# Patient Record
Sex: MALE | Race: WHITE | URBAN - METROPOLITAN AREA
[De-identification: names, ages, dates, MRNs, and addresses within clinical notes are randomized per-mention and may not be internally consistent; named-entity substitution may affect disease eponyms.]

---

## 2017-04-24 ENCOUNTER — THERAPY VISIT (OUTPATIENT)
Dept: PHYSICAL THERAPY | Facility: CLINIC | Age: 82
End: 2017-04-24
Payer: COMMERCIAL

## 2017-04-24 DIAGNOSIS — M25.512 ACUTE PAIN OF LEFT SHOULDER: Primary | ICD-10-CM

## 2017-04-24 PROCEDURE — 97161 PT EVAL LOW COMPLEX 20 MIN: CPT | Mod: GP | Performed by: PHYSICAL THERAPIST

## 2017-04-24 NOTE — PROGRESS NOTES
[facility] Initial Evaluation - Upper Extremity    Evaluation Date: April 24, 2017  Juan Vargas is a 86 year old male with a L shd condition.   Referral: GP    Employment:    Employment status:   Work Mechanical Stresses:   Leisure Mechanical Stresses:   Functional disability from present episode: combing hair getting dressed  Functional disability score (Neck Disability Index): see chart  Visual Analog Score (VAS 0-10): 10/10 with movement    HISTORY:  Pt presents with: ant  Lateral shoulder on  L intermittent  Denies CS pain achy burning sharp  Wearing sling so people dont run onto him  (Constant/Intermittent, Dull/achy/sharp/stabbing/throbbing and location/Radiation)  Associated symptoms (Numbness/tingling/weakness/swelling/catching/clicking/locking/subluxing):  weakness  Onset of symptoms (date/how):  On Easter evening, 4/16/2017  he was reaching with R arm lost his balance and fell to left landing on the shd   Unable to WB thru arm  Xrays wnl  He is R handed  Symptoms Status (new/recurrent/chronic): new and (improving/not changing/worsening): improving a little bit perhaps  Condition occurred in the following environment: home   Symptoms at onset: as above    (with consideration for bending, sitting, turning neck, dressing, reaching, gripping, am, as the day progresses, pm, when still, on the move, sleeping: prone, sup, side R/L, other )  Symptoms are made worse with:  Reach OH out to side BB  Symptoms are made better with:  Tylenol arthritis    Continued use makes pain:  SL LEFT Sleep disturbance: no ;   Pain at rest:  : Site:      Previous episodes: LOB 2 + times in past no damage no previous shoulder problems  Previous treatments:   Improvement with previous treatments:      Specific Questions: (as reported by the patient)  Do you have pain with coughing/sneezing: na  Is gait and coordination of upper extremities normal or abnormal: normal  Patient describes his/her general health  as: good  Imaging/special testing: xrays wnl   Recent or major surgery includes the following: none  Do you have night pain: no  Have you had any recent accidents: yes  Have you experienced any unexplained weight loss: no  Pertinent medical history includes: HTN cardiac bypass cancer diabetes   Medical allergies include: cippro  Current medications: HBP pain  Barriers at home: none  Other red flags: none    OBJECTIVE EXAMINATION:  shoulder (site)    POSTURE:  Sitting: poor  Correction of Posture: na  Standing Posture: poor inc TS kyphosis FHP rounded shds    NEUROLOGICAL (motor/sensory/reflex/dural; if applicable): na    BASELINES: (pain or functional activity): reach OH out to side BB    EXTREMITIES: (Shoulder / Elbow / Wrist / Hand): shoulder   Rt AROM/Pain Lt AROM/Pain Rt PROM w/ w/o OP/Pain Lt PROM w/ w/o OP/Pain   Shld Flexion  25 PDM with GHJ elevation  20 +++           Extension  32 PDM             Abduction  26 PDM  20 +++           Adduction               IR  To sacrum  unable           ER  Major loss PDM  32 +++   Elbow Flexion                  Extension       Wrist Flexion                Extension                Pronation                Supination                UD                RD           Resisted Test Response (pain):    Rt Pain Lt Pain   Shld Flexion               Extension               Abduction               Adduction               IR   5            ER   + RCT +++   Elbow Flexion   5               Extension   5    Wrist Flexion                Extension                Pronation                Supination                UD                RD         Other Tests: biceps tear with ecchymosis  SPINE  Cervical AROM: na  Movement Loss % Loss  Pain   Protrusion     Flexion     Retraction     Extension     Lateral flexion R     Lateral flexion L     Rotation R     Rotation L     Effect of repeated movements: na  Effect of static positioning:   Spine testing (relevant/not relevant/secondary problem):      Baseline Symptoms: reach OH out to side BB  Repeated Tests Symptom Response Mechanical Response   Active/Passive movement, resisted test, functional test During - Produce, Abolish, Increase, Decrease, NE After - Better, Worse, NB, NW, NE Effect - ? or ? ROM, strength or key functional test No Effect   Rep wand ext 10 x Produce worse Dec ROM                         Effect of static positioning                  Provisional Classification: RCT  Extremity or Spine: shoulder  Principle of Management (education/equipment/mechanical therapy/specific principle): return to MD for pain control before cont PT    Subjective:    HPI                    Objective:    System    Physical Exam    General     ROS    Assessment/Plan:      Patient is a 86 year old male with left side shoulder complaints.    Patient has the following significant findings with corresponding treatment plan.                Diagnosis 1:  Suspect RCT along with biceps tear  Pain -  hot/cold therapy and pendulum  Decreased ROM/flexibility - therapeutic exercise  Decreased joint mobility - therapeutic exercise  Decreased strength - none at this time  Decreased function - none  Impaired posture - neuro re-education and home program    Therapy Evaluation Codes:   1) History comprised of:   Personal factors that impact the plan of care:      Age.    Comorbidity factors that impact the plan of care are:      Diabetes, Heart problems and High blood pressure.     Medications impacting care: High blood pressure and Pain.  2) Examination of Body Systems comprised of:   Body structures and functions that impact the plan of care:      Shoulder.   Activity limitations that impact the plan of care are:      Dressing and reaching.  3) Clinical presentation characteristics are:   Stable/Uncomplicated.  4) Decision-Making    Low complexity using standardized patient assessment instrument and/or measureable assessment of functional outcome.  Cumulative Therapy Evaluation  is: Low complexity.    Previous and current functional limitations:  (See Goal Flow Sheet for this information)    Short term and Long term goals: (See Goal Flow Sheet for this information)     Communication ability:  Patient appears to be able to clearly communicate and understand verbal and written communication and follow directions correctly.  Treatment Explanation - The following has been discussed with the patient:   RX ordered/plan of care  Anticipated outcomes  Possible risks and side effects  This patient would benefit from PT intervention to resume normal activities.   Rehab potential is good.    Frequency:  1 X week, once daily  Duration:  for 6 weeks once pain is under control.  Discharge Plan:  Achieve all LTG.  Independent in home treatment program.  Reach maximal therapeutic benefit.    Please refer to the daily flowsheet for treatment today, total treatment time and time spent performing 1:1 timed codes.

## 2017-04-24 NOTE — LETTER
[facility]ER[facility]  [address]  [facility] [address]  [phone]    2017    Re: Juan Vargas   :   1931  MRN:  [id number]   REFERRING PHYSICIAN:   Rich Coughlin    [facility]  Date of Initial Evaluation:  2017  Visits:    1  Reason for Referral:  Acute pain of left shoulder  [facility] Initial Evaluation - Upper Extremity  Evaluation Date: 2017  Juan Vargas is a 86 year old male with a L shd condition.   Referral: GP  Employment:    Employment status:   Work Mechanical Stresses:   Leisure Mechanical Stresses:   Functional disability from present episode: combing hair getting dressed  Functional disability score (Neck Disability Index): see chart  Visual Analog Score (VAS 0-10): 10/10 with movement    HISTORY:  Pt presents with: ant  Lateral shoulder on  L intermittent  Denies CS pain achy burning sharp  Wearing sling so people dont run onto him  (Constant/Intermittent, Dull/achy/sharp/stabbing/throbbing and location/Radiation)  Associated symptoms (Numbness/tingling/weakness/swelling/catching/clicking/locking/subluxing):  weakness  Onset of symptoms (date/how):  On Easter evening, 2017  he was reaching with R arm lost his balance and fell to left landing on the shd   Unable to WB thru arm  Xrays wnl  He is R handed  Symptoms Status (new/recurrent/chronic): new and (improving/not changing/worsening): improving a little bit perhaps  Condition occurred in the following environment: home   Symptoms at onset: as above. (with consideration for bending, sitting, turning neck, dressing, reaching, gripping, am, as the day progresses, pm, when still, on the move, sleeping: prone, sup, side R/L, other )  Symptoms are made worse with:  Reach OH out to side BB  Symptoms are made better with:  Tylenol arthritis  Continued use makes pain:  SL LEFT   Sleep  disturbance: no ;   Pain at rest:  : Site:    Previous episodes: LOB 2 + times in past no damage no previous shoulder problems  Previous treatments:   Improvement with previous treatments:    Re: Juan Vargas   :   1931    Specific Questions: (as reported by the patient)  Do you have pain with coughing/sneezing: na  Is gait and coordination of upper extremities normal or abnormal: normal  Patient describes his/her general health as: good  Imaging/special testing: xrays wnl   Recent or major surgery includes the following: none  Do you have night pain: no  Have you had any recent accidents: yes  Have you experienced any unexplained weight loss: no  Pertinent medical history includes: HTN cardiac bypass cancer diabetes   Medical allergies include: cippro  Current medications: HBP pain  Barriers at home: none  Other red flags: none    OBJECTIVE EXAMINATION:  shoulder (site)    POSTURE:  Sitting: poor  Correction of Posture: na  Standing Posture: poor inc TS kyphosis FHP rounded shds  NEUROLOGICAL (motor/sensory/reflex/dural; if applicable): na  BASELINES: (pain or functional activity): reach OH out to side BB  EXTREMITIES: (Shoulder / Elbow / Wrist / Hand): shoulder   Rt AROM/Pain Lt AROM/Pain Rt PROM w/ w/o OP/Pain Lt PROM w/ w/o OP/Pain   Shld Flexion  25 PDM with GHJ elevation  20 +++           Extension  32 PDM             Abduction  26 PDM  20 +++           Adduction               IR  To sacrum  unable           ER  Major loss PDM  32 +++   Elbow Flexion                  Extension       Wrist Flexion                Extension                Pronation                Supination                UD                RD                     Re: Juan Vargas   :   1931    Resisted Test Response (pain):    Rt Pain Lt Pain   Shld Flexion               Extension               Abduction               Adduction               IR   5            ER   + RCT +++   Elbow Flexion   5               Extension   5     Wrist Flexion                Extension                Pronation                Supination                UD                RD       Other Tests: biceps tear with ecchymosis  SPINE  Cervical AROM: na  Movement Loss % Loss  Pain   Protrusion     Flexion     Retraction     Extension     Lateral flexion R     Lateral flexion L     Rotation R     Rotation L     Effect of repeated movements: na  Effect of static positioning:   Spine testing (relevant/not relevant/secondary problem):   Baseline Symptoms: reach OH out to side BB  Repeated Tests Symptom Response Mechanical Response   Active/Passive movement, resisted test, functional test During - Produce, Abolish, Increase, Decrease, NE After - Better, Worse, NB, NW, NE Effect - ? or ? ROM, strength or key functional test No Effect   Rep wand ext 10 x Produce worse Dec ROM    Effect of static positioning       Provisional Classification: RCT    Extremity or Spine: shoulder  Principle of Management (education/equipment/mechanical therapy/specific principle): return to MD for pain control before cont PT    Re: Juan Vargas   :   1931    Assessment/Plan:    Patient is a 86 year old male with left side shoulder complaints.    Patient has the following significant findings with corresponding treatment plan.                Diagnosis 1:  Suspect RCT along with biceps tear  Pain -  hot/cold therapy and pendulum  Decreased ROM/flexibility - therapeutic exercise  Decreased joint mobility - therapeutic exercise  Decreased strength - none at this time  Decreased function - none  Impaired posture - neuro re-education and home program    Therapy Evaluation Codes:   1) History comprised of:   Personal factors that impact the plan of care:      Age.    Comorbidity factors that impact the plan of care are:      Diabetes, Heart problems and High blood pressure.     Medications impacting care: High blood pressure and Pain.  2) Examination of Body Systems comprised of:   Body  structures and functions that impact the plan of care:      Shoulder.   Activity limitations that impact the plan of care are:      Dressing and reaching.  3) Clinical presentation characteristics are:   Stable/Uncomplicated.  4) Decision-Making    Low complexity using standardized patient assessment instrument and/or measureable assessment of functional outcome.  Cumulative Therapy Evaluation is: Low complexity.    Previous and current functional limitations:  (See Goal Flow Sheet for this information)    Short term and Long term goals: (See Goal Flow Sheet for this information)   Communication ability:  Patient appears to be able to clearly communicate and understand verbal and written communication and follow directions correctly.  Treatment Explanation - The following has been discussed with the patient:   RX ordered/plan of care, Anticipated outcomes, Possible risks and side effects                        Re: Juan Vargas   :   1931    This patient would benefit from PT intervention to resume normal activities.   Rehab potential is good.  Frequency:  1 X week, once daily  Duration:  for 6 weeks once pain is under control.  Discharge Plan:  Achieve all LTG.  Independent in home treatment program.  Reach maximal therapeutic benefit.    Thank you for your referral.    INQUIRIES        Therapist: Paola Karimi, PT, cert MDT  [facility]  [address]  [facility] [address]  Phone: [phone]  Fax: [phone]

## 2017-05-01 ENCOUNTER — THERAPY VISIT (OUTPATIENT)
Dept: PHYSICAL THERAPY | Facility: CLINIC | Age: 82
End: 2017-05-01
Payer: COMMERCIAL

## 2017-05-01 DIAGNOSIS — M25.512 ACUTE PAIN OF LEFT SHOULDER: ICD-10-CM

## 2017-05-01 PROCEDURE — 97110 THERAPEUTIC EXERCISES: CPT | Mod: GP | Performed by: PHYSICAL THERAPIST
